# Patient Record
Sex: FEMALE | Race: OTHER | Employment: OTHER | URBAN - METROPOLITAN AREA
[De-identification: names, ages, dates, MRNs, and addresses within clinical notes are randomized per-mention and may not be internally consistent; named-entity substitution may affect disease eponyms.]

---

## 2019-04-12 ENCOUNTER — APPOINTMENT (OUTPATIENT)
Dept: GENERAL RADIOLOGY | Age: 79
End: 2019-04-12
Attending: PHYSICIAN ASSISTANT
Payer: MEDICARE

## 2019-04-12 ENCOUNTER — HOSPITAL ENCOUNTER (EMERGENCY)
Age: 79
Discharge: HOME | End: 2019-04-12
Payer: MEDICARE

## 2019-04-12 VITALS
OXYGEN SATURATION: 99 % | DIASTOLIC BLOOD PRESSURE: 67 MMHG | SYSTOLIC BLOOD PRESSURE: 184 MMHG | TEMPERATURE: 98 F | RESPIRATION RATE: 16 BRPM | BODY MASS INDEX: 30.64 KG/M2 | HEART RATE: 67 BPM | HEIGHT: 59 IN | WEIGHT: 152 LBS

## 2019-04-12 DIAGNOSIS — M51.36 DEGENERATIVE DISC DISEASE, LUMBAR: ICD-10-CM

## 2019-04-12 DIAGNOSIS — M19.90 OSTEOARTHRITIS, UNSPECIFIED OSTEOARTHRITIS TYPE, UNSPECIFIED SITE: ICD-10-CM

## 2019-04-12 DIAGNOSIS — M54.31 SCIATICA OF RIGHT SIDE: Primary | ICD-10-CM

## 2019-04-12 PROCEDURE — 72110 X-RAY EXAM L-2 SPINE 4/>VWS: CPT | Performed by: PHYSICIAN ASSISTANT

## 2019-04-12 PROCEDURE — 99284 EMERGENCY DEPT VISIT MOD MDM: CPT

## 2019-04-12 PROCEDURE — 73562 X-RAY EXAM OF KNEE 3: CPT | Performed by: PHYSICIAN ASSISTANT

## 2019-04-12 RX ORDER — METHYLPREDNISOLONE 4 MG/1
TABLET ORAL
Qty: 1 PACKAGE | Refills: 0 | Status: SHIPPED | OUTPATIENT
Start: 2019-04-12 | End: 2019-04-17

## 2019-04-12 RX ORDER — ACETAMINOPHEN 500 MG
1000 TABLET ORAL ONCE
Status: COMPLETED | OUTPATIENT
Start: 2019-04-12 | End: 2019-04-12

## 2019-04-12 RX ORDER — ACETAMINOPHEN 500 MG
1000 TABLET ORAL EVERY 4 HOURS PRN
Qty: 100 TABLET | Refills: 0 | Status: SHIPPED | OUTPATIENT
Start: 2019-04-12 | End: 2019-04-19

## 2019-04-12 NOTE — ED PROVIDER NOTES
Patient Seen in: [facility] In [place]    History   Patient presents with:  Back Pain (musculoskeletal)    Stated Complaint: back pain, knee swelling    62-year-old  female with a history of diabetes, diabetic retinopathy, hyp Left knee: She exhibits decreased range of motion and swelling.  She exhibits no effusion, no ecchymosis, no deformity, no laceration, no erythema, normal alignment, no LCL laxity, normal patellar mobility, no bony tenderness, normal meniscus and no MC PROCEDURE:  XR LUMBAR SPINE (MIN 4 VIEWS) (CPT=72110)  TECHNIQUE:  AP, lateral, oblique, and coned down L5-S1 views were obtained. COMPARISON:  Meaghan Morelos ROUTINE, 6/11/2008, 11:57.   INDICATIONS:  back pain, knee swelling  PATIENT STATED HISTOR Dosepack: use as directed on packaging  Qty: 1 Package Refills: 0    acetaminophen 500 MG Oral Tab  Take 2 tablets (1,000 mg total) by mouth every 4 (four) hours as needed for Pain.   Qty: 100 tablet Refills: 0

## 2019-04-12 NOTE — ED INITIAL ASSESSMENT (HPI)
r sided lower back pain for about 3 months- pain worsening over last couple of weeks with radiation around hip and down r leg

## 2019-04-23 PROBLEM — E03.8 HYPOTHYROIDISM DUE TO HASHIMOTO'S THYROIDITIS: Status: ACTIVE | Noted: 2019-04-23

## 2019-04-23 PROBLEM — M54.40 CHRONIC MIDLINE LOW BACK PAIN WITH SCIATICA: Status: ACTIVE | Noted: 2019-04-23

## 2019-04-23 PROBLEM — E78.2 MIXED HYPERLIPIDEMIA: Status: ACTIVE | Noted: 2019-04-23

## 2019-04-23 PROBLEM — E11.9 CONTROLLED TYPE 2 DIABETES MELLITUS WITHOUT COMPLICATION, WITHOUT LONG-TERM CURRENT USE OF INSULIN (HCC): Status: ACTIVE | Noted: 2019-04-23

## 2019-04-23 PROBLEM — I10 ESSENTIAL HYPERTENSION: Status: ACTIVE | Noted: 2019-04-23

## 2019-04-23 PROBLEM — E06.3 HYPOTHYROIDISM DUE TO HASHIMOTO'S THYROIDITIS: Status: ACTIVE | Noted: 2019-04-23

## 2019-04-23 PROBLEM — G89.29 CHRONIC MIDLINE LOW BACK PAIN WITH SCIATICA: Status: ACTIVE | Noted: 2019-04-23

## 2019-04-23 PROBLEM — E11.3299 NON-PROLIFERATIVE DIABETIC RETINOPATHY (HCC): Status: ACTIVE | Noted: 2019-04-23

## 2020-02-11 PROBLEM — E11.9 DIABETES MELLITUS WITHOUT COMPLICATION (HCC): Status: ACTIVE | Noted: 2019-04-23

## 2020-02-26 PROBLEM — F41.9 ANXIETY AND DEPRESSION: Status: ACTIVE | Noted: 2020-02-26

## 2020-02-26 PROBLEM — F32.A ANXIETY AND DEPRESSION: Status: ACTIVE | Noted: 2020-02-26

## 2020-03-28 ENCOUNTER — OFFICE VISIT (OUTPATIENT)
Dept: URGENT CARE | Age: 80
End: 2020-03-28

## 2020-03-28 VITALS
RESPIRATION RATE: 16 BRPM | DIASTOLIC BLOOD PRESSURE: 66 MMHG | TEMPERATURE: 98.1 F | OXYGEN SATURATION: 97 % | HEART RATE: 58 BPM | SYSTOLIC BLOOD PRESSURE: 167 MMHG

## 2020-03-28 DIAGNOSIS — M17.12 OSTEOARTHRITIS OF LEFT KNEE, UNSPECIFIED OSTEOARTHRITIS TYPE: Primary | ICD-10-CM

## 2020-03-28 PROCEDURE — 99203 OFFICE O/P NEW LOW 30 MIN: CPT | Performed by: FAMILY MEDICINE

## 2020-03-28 RX ORDER — HYDROCHLOROTHIAZIDE 12.5 MG/1
TABLET ORAL
COMMUNITY
Start: 2020-03-16

## 2020-03-28 RX ORDER — LEVOTHYROXINE SODIUM 0.07 MG/1
TABLET ORAL
COMMUNITY
Start: 2020-01-05

## 2020-03-28 RX ORDER — FLUTICASONE PROPIONATE 50 MCG
SPRAY, SUSPENSION (ML) NASAL
COMMUNITY
Start: 2020-02-11

## 2020-03-28 RX ORDER — LOSARTAN POTASSIUM 50 MG/1
TABLET ORAL
COMMUNITY
Start: 2020-03-16

## 2020-03-28 RX ORDER — DAPAGLIFLOZIN 10 MG/1
TABLET, FILM COATED ORAL
COMMUNITY
Start: 2020-03-26

## 2020-03-28 RX ORDER — CITALOPRAM 20 MG/1
TABLET ORAL
COMMUNITY
Start: 2020-02-18

## 2020-03-28 RX ORDER — METHYLPREDNISOLONE 4 MG
TABLET, DOSE PACK ORAL
Qty: 21 TABLET | Refills: 0 | Status: SHIPPED | OUTPATIENT
Start: 2020-03-28 | End: 2020-04-03

## 2020-03-28 RX ORDER — ROSUVASTATIN CALCIUM 20 MG/1
TABLET, COATED ORAL
COMMUNITY
Start: 2020-02-18

## 2020-03-28 RX ORDER — DICLOFENAC SODIUM 75 MG/1
TABLET, DELAYED RELEASE ORAL
COMMUNITY
Start: 2020-03-26

## 2020-03-28 RX ORDER — VERAPAMIL HYDROCHLORIDE 240 MG/1
TABLET, FILM COATED, EXTENDED RELEASE ORAL
COMMUNITY
Start: 2020-02-18

## 2020-03-28 SDOH — HEALTH STABILITY: MENTAL HEALTH: HOW OFTEN DO YOU HAVE A DRINK CONTAINING ALCOHOL?: NEVER

## 2021-02-06 DIAGNOSIS — Z23 NEED FOR VACCINATION: ICD-10-CM

## 2021-04-26 ENCOUNTER — WALK IN (OUTPATIENT)
Dept: URGENT CARE | Age: 81
End: 2021-04-26

## 2021-04-26 VITALS
RESPIRATION RATE: 20 BRPM | HEART RATE: 64 BPM | SYSTOLIC BLOOD PRESSURE: 134 MMHG | TEMPERATURE: 98.4 F | DIASTOLIC BLOOD PRESSURE: 86 MMHG | OXYGEN SATURATION: 97 %

## 2021-04-26 DIAGNOSIS — B02.9 HERPES ZOSTER WITHOUT COMPLICATION: Primary | ICD-10-CM

## 2021-04-26 PROCEDURE — 99214 OFFICE O/P EST MOD 30 MIN: CPT | Performed by: FAMILY MEDICINE

## 2021-04-26 RX ORDER — VALACYCLOVIR HYDROCHLORIDE 1 G/1
1000 TABLET, FILM COATED ORAL 3 TIMES DAILY
Qty: 21 TABLET | Refills: 0 | Status: SHIPPED | OUTPATIENT
Start: 2021-04-26 | End: 2021-05-03

## 2021-10-28 ENCOUNTER — WALK IN (OUTPATIENT)
Dept: URGENT CARE | Age: 81
End: 2021-10-28

## 2021-10-28 VITALS
HEART RATE: 75 BPM | OXYGEN SATURATION: 98 % | TEMPERATURE: 97.4 F | DIASTOLIC BLOOD PRESSURE: 68 MMHG | SYSTOLIC BLOOD PRESSURE: 148 MMHG | RESPIRATION RATE: 20 BRPM

## 2021-10-28 DIAGNOSIS — R35.0 FREQUENT URINATION: Primary | ICD-10-CM

## 2021-10-28 LAB
BACTERIA: ABNORMAL
BILIRUBIN URINE: NEGATIVE
BLOOD URINE: ABNORMAL
CLARITY: ABNORMAL
COLOR: YELLOW
GLUCOSE QUALITATIVE U: >=500
KETONES, URINE: NEGATIVE
LEUKOCYTE ESTERASE URINE: ABNORMAL
NITRITE URINE: NEGATIVE
PH URINE: 6 (ref 5–7)
RED BLOOD CELLS URINE: ABNORMAL (ref 0–3)
SPECIFIC GRAVITY URINE: 1.02 (ref 1–1.03)
SQUAMOUS EPITHELIAL CELLS: ABNORMAL
URINE PROTEIN, QUAL (DIPSTICK): NEGATIVE
UROBILINOGEN URINE: <2
WBC CLUMPS: ABNORMAL
WHITE BLOOD CELLS URINE: >100 (ref 0–5)

## 2021-10-28 PROCEDURE — 81003 URINALYSIS AUTO W/O SCOPE: CPT | Performed by: INTERNAL MEDICINE

## 2021-10-28 PROCEDURE — 87186 SC STD MICRODIL/AGAR DIL: CPT | Performed by: INTERNAL MEDICINE

## 2021-10-28 PROCEDURE — 87088 URINE BACTERIA CULTURE: CPT | Performed by: INTERNAL MEDICINE

## 2021-10-28 PROCEDURE — 87086 URINE CULTURE/COLONY COUNT: CPT | Performed by: INTERNAL MEDICINE

## 2021-10-28 PROCEDURE — 99214 OFFICE O/P EST MOD 30 MIN: CPT | Performed by: INTERNAL MEDICINE

## 2021-10-28 RX ORDER — SULFAMETHOXAZOLE AND TRIMETHOPRIM 800; 160 MG/1; MG/1
1 TABLET ORAL 2 TIMES DAILY
Qty: 10 TABLET | Refills: 0 | Status: SHIPPED | OUTPATIENT
Start: 2021-10-28 | End: 2021-11-02

## 2021-10-30 LAB — FINAL REPORT: ABNORMAL

## 2021-11-04 PROBLEM — N18.32 STAGE 3B CHRONIC KIDNEY DISEASE (HCC): Status: ACTIVE | Noted: 2021-11-04

## 2021-11-04 PROBLEM — E11.22 TYPE 2 DIABETES MELLITUS WITH DIABETIC CHRONIC KIDNEY DISEASE (HCC): Status: ACTIVE | Noted: 2021-11-04

## 2021-11-04 PROBLEM — E11.65 TYPE 2 DIABETES MELLITUS WITH HYPERGLYCEMIA (HCC): Status: ACTIVE | Noted: 2021-11-04

## 2021-11-30 ENCOUNTER — WALK IN (OUTPATIENT)
Dept: URGENT CARE | Age: 81
End: 2021-11-30

## 2021-11-30 VITALS
RESPIRATION RATE: 16 BRPM | TEMPERATURE: 97.9 F | HEART RATE: 76 BPM | OXYGEN SATURATION: 99 % | SYSTOLIC BLOOD PRESSURE: 138 MMHG | DIASTOLIC BLOOD PRESSURE: 60 MMHG

## 2021-11-30 DIAGNOSIS — R35.0 URINE FREQUENCY: Primary | ICD-10-CM

## 2021-11-30 LAB
BILIRUBIN URINE: NEGATIVE
BLOOD URINE: ABNORMAL
CLARITY: ABNORMAL
COLOR: YELLOW
GLUCOSE QUALITATIVE U: >=500
KETONES, URINE: NEGATIVE
LEUKOCYTE ESTERASE URINE: ABNORMAL
NITRITE URINE: NEGATIVE
PH URINE: 7 (ref 5–7)
RED BLOOD CELLS URINE: ABNORMAL (ref 0–3)
SPECIFIC GRAVITY URINE: 1.02 (ref 1–1.03)
SQUAMOUS EPITHELIAL CELLS: ABNORMAL
URINE PROTEIN, QUAL (DIPSTICK): NEGATIVE
UROBILINOGEN URINE: <2
WBC CLUMPS: ABNORMAL
WHITE BLOOD CELLS URINE: >100 (ref 0–5)

## 2021-11-30 PROCEDURE — 81003 URINALYSIS AUTO W/O SCOPE: CPT | Performed by: INTERNAL MEDICINE

## 2021-11-30 PROCEDURE — 87186 SC STD MICRODIL/AGAR DIL: CPT | Performed by: INTERNAL MEDICINE

## 2021-11-30 PROCEDURE — 87088 URINE BACTERIA CULTURE: CPT | Performed by: INTERNAL MEDICINE

## 2021-11-30 PROCEDURE — 87086 URINE CULTURE/COLONY COUNT: CPT | Performed by: INTERNAL MEDICINE

## 2021-11-30 PROCEDURE — 99214 OFFICE O/P EST MOD 30 MIN: CPT | Performed by: FAMILY MEDICINE

## 2021-11-30 RX ORDER — CIPROFLOXACIN 250 MG/1
250 TABLET, FILM COATED ORAL 2 TIMES DAILY
Qty: 14 TABLET | Refills: 0 | Status: SHIPPED | OUTPATIENT
Start: 2021-11-30 | End: 2021-12-07

## 2021-12-03 LAB — FINAL REPORT: ABNORMAL

## 2023-11-22 ENCOUNTER — HOSPITAL ENCOUNTER (EMERGENCY)
Age: 83
Discharge: HOME | End: 2023-11-22
Payer: MEDICARE

## 2023-11-22 ENCOUNTER — APPOINTMENT (OUTPATIENT)
Dept: GENERAL RADIOLOGY | Age: 83
End: 2023-11-22
Attending: NURSE PRACTITIONER
Payer: MEDICARE

## 2023-11-22 VITALS
OXYGEN SATURATION: 98 % | SYSTOLIC BLOOD PRESSURE: 175 MMHG | HEART RATE: 67 BPM | BODY MASS INDEX: 25.52 KG/M2 | TEMPERATURE: 98 F | WEIGHT: 130 LBS | HEIGHT: 60 IN | RESPIRATION RATE: 18 BRPM | DIASTOLIC BLOOD PRESSURE: 70 MMHG

## 2023-11-22 DIAGNOSIS — S43.402A SPRAIN OF LEFT SHOULDER, UNSPECIFIED SHOULDER SPRAIN TYPE, INITIAL ENCOUNTER: Primary | ICD-10-CM

## 2023-11-22 DIAGNOSIS — S50.02XA CONTUSION OF LEFT ELBOW, INITIAL ENCOUNTER: ICD-10-CM

## 2023-11-22 PROCEDURE — 99283 EMERGENCY DEPT VISIT LOW MDM: CPT

## 2023-11-22 PROCEDURE — 73030 X-RAY EXAM OF SHOULDER: CPT | Performed by: NURSE PRACTITIONER

## 2023-11-22 PROCEDURE — 73080 X-RAY EXAM OF ELBOW: CPT | Performed by: NURSE PRACTITIONER

## 2024-01-12 ENCOUNTER — TELEPHONE (OUTPATIENT)
Dept: ORTHOPEDICS CLINIC | Facility: CLINIC | Age: 84
End: 2024-01-12

## 2024-01-12 DIAGNOSIS — M54.50 LOW BACK PAIN, UNSPECIFIED BACK PAIN LATERALITY, UNSPECIFIED CHRONICITY, UNSPECIFIED WHETHER SCIATICA PRESENT: Primary | ICD-10-CM

## 2024-01-15 NOTE — TELEPHONE ENCOUNTER
XR ordered per ortho protocol.   XR scheduled.   Called patient to notify them to arrive 15 minutes early to complete imaging.

## 2024-01-18 ENCOUNTER — OFFICE VISIT (OUTPATIENT)
Dept: ORTHOPEDICS CLINIC | Facility: CLINIC | Age: 84
End: 2024-01-18
Payer: MEDICARE

## 2024-01-18 ENCOUNTER — HOSPITAL ENCOUNTER (OUTPATIENT)
Dept: GENERAL RADIOLOGY | Age: 84
Discharge: HOME | End: 2024-01-18
Attending: STUDENT IN AN ORGANIZED HEALTH CARE EDUCATION/TRAINING PROGRAM
Payer: MEDICARE

## 2024-01-18 DIAGNOSIS — M41.50 DEGENERATIVE SCOLIOSIS: Primary | ICD-10-CM

## 2024-01-18 DIAGNOSIS — M54.50 LOW BACK PAIN, UNSPECIFIED BACK PAIN LATERALITY, UNSPECIFIED CHRONICITY, UNSPECIFIED WHETHER SCIATICA PRESENT: ICD-10-CM

## 2024-01-18 PROCEDURE — 72100 X-RAY EXAM L-S SPINE 2/3 VWS: CPT | Performed by: STUDENT IN AN ORGANIZED HEALTH CARE EDUCATION/TRAINING PROGRAM

## 2024-01-18 PROCEDURE — 1160F RVW MEDS BY RX/DR IN RCRD: CPT | Performed by: STUDENT IN AN ORGANIZED HEALTH CARE EDUCATION/TRAINING PROGRAM

## 2024-01-18 PROCEDURE — 1159F MED LIST DOCD IN RCRD: CPT | Performed by: STUDENT IN AN ORGANIZED HEALTH CARE EDUCATION/TRAINING PROGRAM

## 2024-01-18 PROCEDURE — 99204 OFFICE O/P NEW MOD 45 MIN: CPT | Performed by: STUDENT IN AN ORGANIZED HEALTH CARE EDUCATION/TRAINING PROGRAM

## 2024-01-18 RX ORDER — METHYLPREDNISOLONE 4 MG/1
TABLET ORAL
Qty: 21 TABLET | Refills: 0 | Status: SHIPPED | OUTPATIENT
Start: 2024-01-18

## 2024-01-18 NOTE — H&P
[facility] - ORTHOPEDICS  [address], [address], [place] [address]  [address] [place] [address]  [phone]     NEW PATIENT VISIT - HISTORY AND PHYSICAL EXAMINATION     Name: Trudy Steel   MRN: [id number]  Date: 01/18/24       CC: Back and leg pain    REFERRED BY: Juan Pierre MD    HPI:   Trudy Steel is a very pleasant 83 year old female who presents today for evaluation of back and leg pain. The distribution of symptoms are: 50% backpain and 50% leg pain. The symptoms began 1 week(s) ago without any significant injury. Since the onset, the symptoms have remained the same. Patient feels pain is aggravated by standing, walking and improved by rest. The patient reports no numbness and no weakness.  The symptom characteristics are as follows: Patient is a 83-year-old female presenting with low back pain radiating down bilateral buttocks.  Patient was prescribed anti-inflammatory with improvement in symptoms.  Patient has also started using rolling walker    Prior spine surgery: none.    Bowel and bladder symptoms: absent.    The patient has not had issues with balance and/or hand dexterity problems such as changes in penmanship or the use of buttons or zippers.    Treatment up to this time has included:    Evaluation: PCP  NSAIDS: have been partially helpful  Narcotic use: None  Physical therapy: None  Spinal injections: None  Others:       PMH:   Past Medical History:   Diagnosis Date    Diabetes (HCC)     Diabetic retinopathy (HCC)     Essential hypertension     Hyperlipidemia     Obesity     Thyroid disease        PAST SURGICAL HX:  Past Surgical History:   Procedure Laterality Date    TUBAL LIGATION         FAMILY HX:  History reviewed. No pertinent family history.    ALLERGIES:  Patient has no known allergies.    MEDICATIONS:   Current Outpatient Medications   Medication Sig Dispense Refill    methylPREDNISolone (MEDROL) 4 MG Oral Tablet Therapy  Pack As directed. 21 tablet 0    metFORMIN HCl 1000 MG Oral Tab Take 1 tablet (1,000 mg total) by mouth 2 (two) times daily. 180 tablet 0    diclofenac 75 MG Oral Tab EC Take 1 tablet (75 mg total) by mouth 2 (two) times daily. 20 tablet 0    citalopram 20 MG Oral Tab Take 1 tablet (20 mg total) by mouth daily. 90 tablet 0    dapagliflozin (FARXIGA) 10 MG Oral Tab Take 1 tablet (10 mg total) by mouth daily. 90 tablet 0    hydroCHLOROthiazide 12.5 MG Oral Tab Take 1 tablet (12.5 mg total) by mouth daily. 90 tablet 0    levothyroxine 100 MCG Oral Tab Take 1 tablet (100 mcg total) by mouth daily. 90 tablet 0    linaGLIPtin (TRADJENTA) 5 mg Oral Tab Take 1 tablet (5 mg total) by mouth daily. 90 tablet 0    losartan 50 MG Oral Tab Take 1 tablet (50 mg total) by mouth daily. 90 tablet 0    rosuvastatin 20 MG Oral Tab Take 1 tablet (20 mg total) by mouth nightly. 90 tablet 0    verapamil  MG Oral Tab CR Take 1 tablet (240 mg total) by mouth nightly. 90 tablet 0    aspirin (ASPIRIN LOW DOSE) 81 MG Oral Tab EC Take 1 tablet (81 mg total) by mouth daily. 90 tablet 0    Blood Glucose Monitoring Suppl (ONETOUCH VERIO) w/Device Does not apply Kit 1 each daily. icd10- e11.9 1 kit 0    Glucose Blood (ONETOUCH VERIO) In Vitro Strip TEST ONCE DAILY 100 strip 11       ROS: A comprehensive 14 point review of systems was performed and was negative aside from the aforementioned per history of present illness.    SOCIAL HX:  Social History     Tobacco Use    Smoking status: Never    Smokeless tobacco: Never   Substance Use Topics    Alcohol use: No         PE:   There were no vitals filed for this visit.  Estimated body mass index is 24.61 kg/m² as calculated from the following:    Height as of 1/2/24: 5' (1.524 m).    Weight as of 1/2/24: 126 lb (57.2 kg).    Physical Exam  Constitutional:       Appearance: Normal appearance.   HENT:      Head: Normocephalic and atraumatic.   Eyes:      Extraocular Movements: Extraocular movements  intact.   Cardiovascular:      Pulses: Normal pulses. Skin warm and well perfused.  Pulmonary:      Effort: Pulmonary effort is normal. No respiratory distress.   Skin:     General: Skin is warm.   Psychiatric:         Mood and Affect: Mood normal.     Spine Exam:    Normal gait without difficulty  Able to heel, toe, tandem gait without difficulty  Level shoulders and hips in even stance    Restricted L-spine ROM    No tenderness to palpation of L-spine    Straight leg raise test: negative    Sustained clonus: negative    LE Strength: 5/5 IP QUAD TA EHL GSC  LE Sensation: normal in L2-S1 distribution  LE reflexes: normal    Radiographic Examination/Diagnostics:  XR personally viewed, independently interpreted and radiology report was reviewed.  X-ray of the lumbar spine demonstrates degenerative scoliosis with moderate facet arthropathy throughout    IMPRESSION: Trudy Steel is a 83 year old female with degenerative scoliosis and likely lumbar stenosis    PLAN:     We reviewed the patients history, symptoms, exam findings, and imaging today.  We had a detailed discussion outlining the etiology, anatomy, pathophysiology, and natural history of lumbar stenosis. The typical management of this condition may include lifestyle modification, NSAIDs, physical therapy, oral steroids, epidural injections, neuromodulatory medications, and sometimes pain medications.  Based on our discussion today we would like to have the patient initiate our recommendations for continued conservative therapy in the treatment of their condition noted in the assessment section.     - Referred to Physical Therapy: home exercise program, aerobic exercises, core strengthening and conditioning, possible manipulative therapy,  and modalities as indicated  - Provided home exercises  - Due to patient's symptoms, discussed risk and benefit of short-term steroid use. Prescribed medrol dose pack and strict glycemic control    FOLLOW-UP:  We will see  her back in follow-up in one month, or sooner if any problems arise. Patient understands and agrees with plan.      Jesus Raya MD  Orthopedic Spine Surgeon  [facility]   [address] [place] [address], [facility], [address]  [email or web address]  t: [phone]   f: [phone]        This note was dictated using Dragon software.  While it was briefly proofread prior to completion, some grammatical, spelling, and word choice errors due to dictation may still occur.

## 2024-07-09 PROBLEM — F33.0 MILD RECURRENT MAJOR DEPRESSION: Chronic | Status: ACTIVE | Noted: 2024-07-09

## 2025-02-04 ENCOUNTER — HOSPITAL ENCOUNTER (EMERGENCY)
Age: 85
Discharge: HOME | End: 2025-02-04
Attending: EMERGENCY MEDICINE
Payer: MEDICARE

## 2025-02-04 VITALS
DIASTOLIC BLOOD PRESSURE: 79 MMHG | RESPIRATION RATE: 16 BRPM | SYSTOLIC BLOOD PRESSURE: 163 MMHG | TEMPERATURE: 98 F | OXYGEN SATURATION: 100 % | HEIGHT: 60 IN | WEIGHT: 125 LBS | BODY MASS INDEX: 24.54 KG/M2 | HEART RATE: 60 BPM

## 2025-02-04 DIAGNOSIS — L60.0 INGROWN TOENAIL: Primary | ICD-10-CM

## 2025-02-04 PROCEDURE — 99283 EMERGENCY DEPT VISIT LOW MDM: CPT

## 2025-02-04 RX ORDER — NAPROXEN 500 MG/1
500 TABLET ORAL 2 TIMES DAILY PRN
Qty: 20 TABLET | Refills: 0 | Status: SHIPPED | OUTPATIENT
Start: 2025-02-04 | End: 2025-02-14

## 2025-02-04 RX ORDER — SULFAMETHOXAZOLE AND TRIMETHOPRIM 800; 160 MG/1; MG/1
1 TABLET ORAL 2 TIMES DAILY
Qty: 14 TABLET | Refills: 0 | Status: SHIPPED | OUTPATIENT
Start: 2025-02-04 | End: 2025-02-11

## 2025-02-04 NOTE — ED PROVIDER NOTES
Patient Seen in: [facility] [place]      History     Chief Complaint   Patient presents with    Cellulitis     Stated Complaint: left great toe pain, diabetic, pain one week    Subjective:   HPI      84-year-old female with a past medical history as below including hypertension and diabetes presents with pain in her left great toe along her nail fold.  Patient is mainly German-speaking with daughter interpreting, declined video .  Patient reports mild swelling redness with no drainage.  Daughter states she is trying to set up an appointment with a podiatrist but decided to come to ED instead.  Denies fever or chills.    Objective:     Past Medical History:    Diabetes (HCC)    Diabetic retinopathy (HCC)    Essential hypertension    Hyperlipidemia    Obesity    Thyroid disease              No pertinent past surgical history.              No pertinent social history.                Physical Exam     ED Triage Vitals [02/04/25 1521]   /60   Pulse 63   Resp 18   Temp 97.7 °F (36.5 °C)   Temp src Temporal   SpO2 97 %   O2 Device None (Room air)       Current Vitals:   Vital Signs  BP: 148/60  Pulse: 63  Resp: 18  Temp: 97.7 °F (36.5 °C)  Temp src: Temporal    Oxygen Therapy  SpO2: 97 %  O2 Device: None (Room air)        Physical Exam  Vitals and nursing note reviewed.   Constitutional:       Appearance: She is well-developed.   HENT:      Head: Normocephalic and atraumatic.      Mouth/Throat:      Mouth: Mucous membranes are moist.   Eyes:      General: No scleral icterus.  Musculoskeletal:        Feet:    Skin:     General: Skin is warm and dry.   Neurological:      General: No focal deficit present.      Mental Status: She is alert and oriented to person, place, and time.      Cranial Nerves: No cranial nerve deficit.      Motor: No weakness.   Psychiatric:         Mood and Affect: Mood normal.         Behavior: Behavior normal.             ED Course   Labs Reviewed - No data  to display                MDM      84-year-old female with a past medical history as below including hypertension and diabetes presents with pain in her left great toe along her nail fold.     Differential includes but is not limited to ingrown toenail, mild cellulitis.  No signs of abscess.    Will treat with Rx for Bactrim and naproxen.  Instructed to follow-up with podiatrist next 1 to 2 days for further treatment of ingrown toenail.  Return precaution discussed      Medical Decision Making  Amount and/or Complexity of Data Reviewed  Independent Historian: caregiver     Details: Daughter, see HPI    Risk  Prescription drug management.        Disposition and Plan     Clinical Impression:  1. Ingrown toenail         Disposition:  Discharge  2/4/2025  4:17 pm    Follow-up:  Ananth Amador DPM  [address] [address]  [phone]    Schedule an appointment as soon as possible for a visit in 2 day(s)            Medications Prescribed:  Current Discharge Medication List        START taking these medications    Details   sulfamethoxazole-trimethoprim -160 MG Oral Tab per tablet Take 1 tablet by mouth 2 (two) times daily for 7 days.  Qty: 14 tablet, Refills: 0      naproxen 500 MG Oral Tab Take 1 tablet (500 mg total) by mouth 2 (two) times daily as needed.  Qty: 20 tablet, Refills: 0                 Supplementary Documentation:

## 2025-02-05 ENCOUNTER — PATIENT OUTREACH (OUTPATIENT)
Dept: CASE MANAGEMENT | Age: 85
End: 2025-02-05

## 2025-02-05 NOTE — PROGRESS NOTES
Transitions of Care Navigation  Discharge Date: 25  Contact Date: 2025    Transitions of Care Assessment:  BRANDON Initial Assessment    General:  Assessment completed with: Patient  Patient Subjective: Spoke with patient's daughter Corrina. Corrina states that her mother is doing ok but is concerned for her toe. Corrina states the patient's toe looks about the same as it did yesterday. Continues to be red and swollen. No fever. Started her antibiotic today and is taking the Naproxen. Corrina states her biggest concern is getting her an appointment with Corrina states that she has the referral to the Podiatrist which she obtained by her PCP this morning but she was told no one can see her when she called to schedule an appointment. (See Nursing Interventions)  Chief Complaint: Ingrown toenail  Verify patient name and  with patient/ caregiver: Yes    Hospital Stay/Discharge:  Tell me what you understand of why you were in the hospital or emergency department: an ingrown toenail  Prior to leaving the hospital were your Discharge Instructions reviewed with you?: Yes  Did you receive a copy of your written Discharge Instructions?: Yes  What questions do you have about your Discharge Instructions?: patient denies  Do you feel better or worse since you left the hospital or emergency department?: Same    Follow - Up Appointment:  Do you have a follow-up appointment?: No  Are there any barriers to getting to your follow-up appointment?: No    Home Health/DME:  Prior to leaving the hospital was Home Health (HH) arranged for you?: N/A  Are HH needs identified by staff during the assessment?: No     Prior to leaving the hospital or emergency department was Durable Medical Equipment (DME), medical supplies, or infusions arranged for you?: N/A  Are DME/medical supply/infusions needs identified by staff during this assessment?: No     Medications/Diet:  Did any of your medications change, during or after your hospital stay or ED  visit?: Yes  Do you have your new or updated medications?: Yes  Do you understand what your medications are for and possible side effects?: No  Educated patient / caregiver on medications.: No  Are there any reasons that keep you from taking your medication as prescribed?: No  Any concerns about medication refills?: No    Were you given a different diet per your Discharge Instructions?: No     Questions/Concerns:  Do you have any questions or concerns that have not been discussed?: Yes         Nursing Interventions:    Advised to continue with the antibiotic and Naproxen. Try a foot soak- warm water with a tsp of epsom salt for 10 mins. Reviewed signs and symptoms of infection.     NCM called Podiatry and scheduled appointment for tomorrow 02/06- 2:30- Dr. Stoney Daniels. [address] [facility] [address]. Placed office on hold and advised daughter of appointment details and she was agreeable- her brother will bring patient tomorrow.     Future Appointments   Date Time Provider Department Center   2/6/2025  2:30 PM [facility]ua DPM [id number]   2/11/2025  3:20 PM [facility] DEXA  1 [facility] DEXA [facility]     All d/c instructions reviewed with pt.  Reviewed when to call MD vs when to go to ER/call 911.  Educated pt on the importance of taking all meds as prescribed as well as close f/u with PCP/specialists.  Pt verbalized understanding and will contact office with any further questions or concerns.         Medications:  Medication Reconciliation:  I am aware of an inpatient discharge within the last 30 days.  The discharge medication list has been reconciled with the patient's current medication list and reviewed by me. See medication list for additions of new medication, and changes to current doses of medications and discontinued medications.  Current Outpatient Medications   Medication Sig Dispense Refill    sulfamethoxazole-trimethoprim -160 MG Oral Tab per tablet Take 1 tablet by mouth 2 (two) times  daily for 7 days. 14 tablet 0    naproxen 500 MG Oral Tab Take 1 tablet (500 mg total) by mouth 2 (two) times daily as needed. 20 tablet 0    hydroCHLOROthiazide 12.5 MG Oral Tab Take 1 tablet (12.5 mg total) by mouth daily. 90 tablet 1    ROSUVASTATIN 20 MG Oral Tab Take 1 tablet (20 mg total) by mouth nightly. 90 tablet 0    CITALOPRAM 20 MG Oral Tab Take 1 tablet (20 mg total) by mouth daily. 90 tablet 0    FARXIGA 10 MG Oral Tab Take 1 tablet (10 mg total) by mouth daily. 90 tablet 0    METFORMIN HCL 1000 MG Oral Tab Take 1 tablet (1,000 mg total) by mouth 2 (two) times daily. 180 tablet 0    LEVOTHYROXINE 88 MCG Oral Tab Take 1 tablet by mouth before breakfast 90 tablet 0    VERAPAMIL  MG Oral Tab CR Take 1 tablet (240 mg total) by mouth nightly. 90 tablet 0    linaGLIPtin (TRADJENTA) 5 mg Oral Tab Take 1 tablet (5 mg total) by mouth daily. 90 tablet 0    LOSARTAN 50 MG Oral Tab Take 1 tablet (50 mg total) by mouth daily. 90 tablet 0    aspirin (ASPIRIN LOW DOSE) 81 MG Oral Tab EC Take 1 tablet (81 mg total) by mouth daily. 90 tablet 0    Blood Glucose Monitoring Suppl (ONETOUCH VERIO) w/Device Does not apply Kit 1 each daily. icd10- e11.9 1 kit 0    Glucose Blood (ONETOUCH VERIO) In Vitro Strip TEST ONCE DAILY 100 strip 11       Diagnosis specifics:  (Please enter following SmartPhrase as applicable)  AMI: .TOCAMI  CHF: .TOCCHF  COPD: .TOCCOPD  CVA: .TOCCVA  CV surgery: .TOCCVSURGERY  Pneumonia: .TOCPNEUMONIA  Re-admit: .TOCREADMIT  Warfarin/Coumadin: .TOCWARFARIN    Follow-up Appointments:  Your appointments       Date & Time Appointment Department (Center)    Feb 11, 2025 3:20 PM CST X-ray Bone Densitometry (Dexa) with [facility] DEXA  1 [facility] DEXA [facility] ([facility])    It is recommended that you wear a 2 piece outfit that does not contain any metal such as snaps and zippers, etc.  Attention women: Underwire bras will need to be removed prior to the scan.    If you have the  report from a prior DEXA scan performed elsewhere, please bring the report with you to your appointment.       Feb 11, 2025 3:20 PM CST X-ray Bone Densitometry (Dexa) with PFS HERBERT 1 [facility] ([facility])    It is recommended that you wear a 2 piece outfit that does not contain any metal such as snaps and zippers, etc.  Attention women: Underwire bras will need to be removed prior to the scan.    If you have the report from a prior DEXA scan performed elsewhere, please bring the report with you to your appointment.       Feb 11, 2025 3:20 PM CST X-ray Bone Densitometry (Dexa) with PFS XR [address]-ray [facility] ([facility])    It is recommended that you wear a 2 piece outfit that does not contain any metal such as snaps and zippers, etc.  Attention women: Underwire bras will need to be removed prior to the scan.    If you have the report from a prior DEXA scan performed elsewhere, please bring the report with you to your appointment.             [facility] DEXA - [facility]  [address]  [facility] [address]  [phone] [facility] Mammography [facility]  [address]  [facility] [address]  [phone] [facility] X-ray [facility]  [address]  [facility] [address]  [phone]            [facility] Clinic  Was TCC Ordered: No    Specialist  Does the patient have any other follow-up appointment(s) need to be scheduled? No   -If yes: Nurse Care Manager reviewed upcoming specialist appointments with patient: Yes   -Does the patient need assistance scheduling appointment(s): No- RN scheduled appointment       Book By Date: 02/11/2025

## 2025-02-05 NOTE — PROGRESS NOTES
NCM attempted to reach the patient to complete a transitions of care call. Left message to call back. [facility] provided direct contact info at [phone].

## 2025-02-06 ENCOUNTER — OFFICE VISIT (OUTPATIENT)
Dept: PODIATRY CLINIC | Facility: CLINIC | Age: 85
End: 2025-02-06

## 2025-02-06 DIAGNOSIS — E11.42 DIABETIC POLYNEUROPATHY ASSOCIATED WITH TYPE 2 DIABETES MELLITUS (HCC): ICD-10-CM

## 2025-02-06 DIAGNOSIS — M79.675 TOE PAIN, LEFT: ICD-10-CM

## 2025-02-06 DIAGNOSIS — L03.032 PARONYCHIA OF GREAT TOE OF LEFT FOOT: Primary | ICD-10-CM

## 2025-02-06 PROCEDURE — 11730 AVULSION NAIL PLATE SIMPLE 1: CPT | Performed by: STUDENT IN AN ORGANIZED HEALTH CARE EDUCATION/TRAINING PROGRAM

## 2025-02-06 PROCEDURE — 99203 OFFICE O/P NEW LOW 30 MIN: CPT | Performed by: STUDENT IN AN ORGANIZED HEALTH CARE EDUCATION/TRAINING PROGRAM

## 2025-02-07 NOTE — PROGRESS NOTES
Per  block to draw up .2.5ml of 1% Lidocaine and 2.5ml marcaine 0.5% for a left hallux Ingrown medial  borders Toenail Procedure.

## 2025-02-08 NOTE — PROGRESS NOTES
[facility]  Progress Note    Trudy Steel is a 84 year old female.   Chief Complaint   Patient presents with    Consult     Left hallux- rates pain 8/10- language line [id number]/renato         HPI:     Patient is a pleasant 84-year-old female presents to clinic with son for evaluation of painful ingrown nail to her left hallux.  She has pain that she rates an 8 out of 10.  She did go to urgent care and was given Bactrim which she just started taking.  She is wondering what treatment options are available.  She has noticed redness, drainage, pain to site.  She is diabetic and her last hemoglobin A1c was 6.8% on 1/27/2025.  No other complaints are mentioned.  Past medical history, medications, and allergies reviewed.      Allergies: Patient has no known allergies.   Current Outpatient Medications   Medication Sig Dispense Refill    sulfamethoxazole-trimethoprim -160 MG Oral Tab per tablet Take 1 tablet by mouth 2 (two) times daily for 7 days. 14 tablet 0    naproxen 500 MG Oral Tab Take 1 tablet (500 mg total) by mouth 2 (two) times daily as needed. 20 tablet 0    hydroCHLOROthiazide 12.5 MG Oral Tab Take 1 tablet (12.5 mg total) by mouth daily. 90 tablet 1    ROSUVASTATIN 20 MG Oral Tab Take 1 tablet (20 mg total) by mouth nightly. 90 tablet 0    CITALOPRAM 20 MG Oral Tab Take 1 tablet (20 mg total) by mouth daily. 90 tablet 0    FARXIGA 10 MG Oral Tab Take 1 tablet (10 mg total) by mouth daily. 90 tablet 0    METFORMIN HCL 1000 MG Oral Tab Take 1 tablet (1,000 mg total) by mouth 2 (two) times daily. 180 tablet 0    LEVOTHYROXINE 88 MCG Oral Tab Take 1 tablet by mouth before breakfast 90 tablet 0    VERAPAMIL  MG Oral Tab CR Take 1 tablet (240 mg total) by mouth nightly. 90 tablet 0    linaGLIPtin (TRADJENTA) 5 mg Oral Tab Take 1 tablet (5 mg total) by mouth daily. 90 tablet 0    LOSARTAN 50 MG Oral Tab Take 1 tablet (50 mg total) by mouth daily. 90 tablet 0    aspirin (ASPIRIN LOW DOSE) 81 MG  Oral Tab EC Take 1 tablet (81 mg total) by mouth daily. 90 tablet 0    Blood Glucose Monitoring Suppl (ONETOUCH VERIO) w/Device Does not apply Kit 1 each daily. icd10- e11.9 1 kit 0    Glucose Blood (ONETOUCH VERIO) In Vitro Strip TEST ONCE DAILY 100 strip 11      Past Medical History:    Diabetes (HCC)    Diabetic retinopathy (HCC)    Essential hypertension    Hyperlipidemia    Obesity    Thyroid disease      Past Surgical History:   Procedure Laterality Date    Tubal ligation        History reviewed. No pertinent family history.   Social History     Socioeconomic History    Marital status:    Tobacco Use    Smoking status: Never    Smokeless tobacco: Never   Vaping Use    Vaping status: Never Used   Substance and Sexual Activity    Alcohol use: No    Drug use: No           REVIEW OF SYSTEMS:     No n/v/f/c.      EXAM:   There were no vitals taken for this visit.  GENERAL: well developed, well nourished, in no apparent distress  EXTREMITIES:   1. Integument: Normal skin temperature and turgor. Medial border of left great toe is incurvated with erythema and edema noted.  2. Vascular:Pedal pulses are palpable.    3. Musculoskeletal: All muscle groups are graded 5 out of 5 in the foot and ankle. Pain noted to medial border of left great toe.   4. Neurological: Normal sharp dull sensation; reflexes normal.          ASSESSMENT AND PLAN:   Diagnoses and all orders for this visit:    Paronychia of great toe of left foot    Toe pain, left    Diabetic polyneuropathy associated with type 2 diabetes mellitus (HCC)        Plan:    -Patient examined, chart history reviewed.  -Discussed etiology of patient's condition and various treatment options.  -Recommend nail avulsion to medial border of left great toe at this time given paronychia to site.  Discussed procedure in detail with patient  including benefits, risks, and recovery period.  Patient agreeable with procedure and written consent was obtained.    Procedure as  follows:  After prepping site with alcohol, administered local infiltrative block to left hallux consisting of 5 cc of 1:1 mixture of 0.5% Marcaine plain and 1% lidocaine plain.  After sufficient anesthesia was achieved, the digit was prepped with Betadine.  Next, using a hemostat, the medial border of the left hallux nail was freed.  An English anvil was then used to cut the incurvated portion of the nail down to the nail matrix.  A hemostat was once again used to remove the incurvated portion of the nail in its entirety.  The site was then copiously irrigated with saline and patted dry.  The site was dressed with bacitracin, gauze, and mildly compressive Coban wrap.  The patient tolerated the procedure well and there were no complications.  No tourniquet was used for the procedure.    -All soaking and aftercare instructions were discussed with the patient.  Informational handout was dispensed.  -Finish bactrim that was prescribed by urgent care.  -Educated patient on acute signs of infection advised patient to seek immediate medical attention if any concerns arise.     The patient indicates understanding of these issues and agrees to the plan.    RTC 1-2 weeks for re-evaluation or sooner if other concerns arise.    Edward Lua DPM    Dragon speech recognition software was used to prepare this note.  Errors in word recognition may occur.  Please contact me with any questions/concerns with this note.

## 2025-02-11 ENCOUNTER — HOSPITAL ENCOUNTER (OUTPATIENT)
Dept: BONE DENSITY | Age: 85
Discharge: HOME | End: 2025-02-11
Attending: INTERNAL MEDICINE
Payer: MEDICARE

## 2025-02-11 DIAGNOSIS — Z78.0 POST-MENOPAUSAL: ICD-10-CM

## 2025-02-11 PROCEDURE — 77080 DXA BONE DENSITY AXIAL: CPT | Performed by: INTERNAL MEDICINE

## 2025-02-20 ENCOUNTER — OFFICE VISIT (OUTPATIENT)
Dept: PODIATRY CLINIC | Facility: CLINIC | Age: 85
End: 2025-02-20

## 2025-02-20 DIAGNOSIS — M20.41 HAMMER TOES OF BOTH FEET: ICD-10-CM

## 2025-02-20 DIAGNOSIS — M20.42 HAMMER TOES OF BOTH FEET: ICD-10-CM

## 2025-02-20 DIAGNOSIS — L03.032 PARONYCHIA OF GREAT TOE OF LEFT FOOT: Primary | ICD-10-CM

## 2025-02-20 DIAGNOSIS — B35.1 ONYCHOMYCOSIS: ICD-10-CM

## 2025-02-20 DIAGNOSIS — E11.42 DIABETIC POLYNEUROPATHY ASSOCIATED WITH TYPE 2 DIABETES MELLITUS (HCC): ICD-10-CM

## 2025-02-20 PROCEDURE — 1160F RVW MEDS BY RX/DR IN RCRD: CPT | Performed by: STUDENT IN AN ORGANIZED HEALTH CARE EDUCATION/TRAINING PROGRAM

## 2025-02-20 PROCEDURE — 99213 OFFICE O/P EST LOW 20 MIN: CPT | Performed by: STUDENT IN AN ORGANIZED HEALTH CARE EDUCATION/TRAINING PROGRAM

## 2025-02-20 PROCEDURE — 1159F MED LIST DOCD IN RCRD: CPT | Performed by: STUDENT IN AN ORGANIZED HEALTH CARE EDUCATION/TRAINING PROGRAM

## 2025-02-20 PROCEDURE — 1126F AMNT PAIN NOTED NONE PRSNT: CPT | Performed by: STUDENT IN AN ORGANIZED HEALTH CARE EDUCATION/TRAINING PROGRAM

## 2025-02-20 NOTE — PROGRESS NOTES
[facility]  Progress Note    Trudy Steel is a 84 year old female.   Chief Complaint   Patient presents with    Ingrown Toenail     Left hallux f/u - states she has no pain - no drainage - this AM her CO=897         HPI:     Patient is a pleasant 84-year-old female presents to clinic with son for follow up of painful ingrown nail to her left hallux.  She relates that this site is doing much better after nail avulsion procedure.  She has performed soaking aftercare as advised.  Remaining nails are elongated and thickened.  Her blood sugars are 104 mg/dL when last checked.  She is diabetic and her last hemoglobin A1c was 6.8% on 1/27/2025.  No other complaints are mentioned.  Past medical history, medications, and allergies reviewed.      Allergies: Patient has no known allergies.   Current Outpatient Medications   Medication Sig Dispense Refill    Calcium Carbonate-Vit D-Min (CALCIUM 1200) 8066-2069 MG-UNIT Oral Chew Tab Chew 1 tablet by mouth daily. 90 tablet 0    hydroCHLOROthiazide 12.5 MG Oral Tab Take 1 tablet (12.5 mg total) by mouth daily. 90 tablet 1    ROSUVASTATIN 20 MG Oral Tab Take 1 tablet (20 mg total) by mouth nightly. 90 tablet 0    CITALOPRAM 20 MG Oral Tab Take 1 tablet (20 mg total) by mouth daily. 90 tablet 0    FARXIGA 10 MG Oral Tab Take 1 tablet (10 mg total) by mouth daily. 90 tablet 0    METFORMIN HCL 1000 MG Oral Tab Take 1 tablet (1,000 mg total) by mouth 2 (two) times daily. 180 tablet 0    LEVOTHYROXINE 88 MCG Oral Tab Take 1 tablet by mouth before breakfast 90 tablet 0    VERAPAMIL  MG Oral Tab CR Take 1 tablet (240 mg total) by mouth nightly. 90 tablet 0    linaGLIPtin (TRADJENTA) 5 mg Oral Tab Take 1 tablet (5 mg total) by mouth daily. 90 tablet 0    LOSARTAN 50 MG Oral Tab Take 1 tablet (50 mg total) by mouth daily. 90 tablet 0    aspirin (ASPIRIN LOW DOSE) 81 MG Oral Tab EC Take 1 tablet (81 mg total) by mouth daily. 90 tablet 0    Blood Glucose Monitoring Suppl  (ONETOUCH VERIO) w/Device Does not apply Kit 1 each daily. icd10- e11.9 1 kit 0    Glucose Blood (ONETOUCH VERIO) In Vitro Strip TEST ONCE DAILY 100 strip 11      Past Medical History:    Diabetes (HCC)    Diabetic retinopathy (HCC)    Essential hypertension    Hyperlipidemia    Obesity    Thyroid disease      Past Surgical History:   Procedure Laterality Date    Tubal ligation        History reviewed. No pertinent family history.   Social History     Socioeconomic History    Marital status:    Tobacco Use    Smoking status: Never    Smokeless tobacco: Never   Vaping Use    Vaping status: Never Used   Substance and Sexual Activity    Alcohol use: No    Drug use: No           REVIEW OF SYSTEMS:     No n/v/f/c.      EXAM:   There were no vitals taken for this visit.  GENERAL: well developed, well nourished, in no apparent distress  EXTREMITIES:   1. Integument: Normal skin temperature and turgor.  Site of nail avulsion medial border left great toe is well-healed without concerns.  Remaining nails are elongated and thickened.  2. Vascular:Pedal pulses are palpable.    3. Musculoskeletal: All muscle groups are graded 5 out of 5 in the foot and ankle. Pain noted to medial border of left great toe.   4. Neurological: Normal sharp dull sensation; reflexes normal.          ASSESSMENT AND PLAN:   Diagnoses and all orders for this visit:    Paronychia of great toe of left foot    Diabetic polyneuropathy associated with type 2 diabetes mellitus (HCC)    Onychomycosis    Hammer toes of both feet        Plan:    -Patient examined, chart history reviewed.  -Inspected site of nail avulsion.  The site is well-healed without acute signs of infection.  No need for further soaking aftercare at this time.  -Sharply debrided remaining nails with nail nipper x 9.  Nails further smoothed with Dremel.  -Patient should check feet daily and seek immediate medical attention any skin breakdown or other concerns arise.  -Educated patient  acute signs of infection advised patient to seek immediate medical attention any concerns arise.    RTC 2 months routine footcare or sooner if other concerns arise.    Edward Lua DPM    Dragon speech recognition software was used to prepare this note.  Errors in word recognition may occur.  Please contact me with any questions/concerns with this note.

## 2025-02-24 ENCOUNTER — HOSPITAL ENCOUNTER (EMERGENCY)
Age: 85
Discharge: HOME | End: 2025-02-24
Attending: EMERGENCY MEDICINE
Payer: MEDICARE

## 2025-02-24 ENCOUNTER — APPOINTMENT (OUTPATIENT)
Dept: CT IMAGING | Age: 85
End: 2025-02-24
Attending: EMERGENCY MEDICINE
Payer: MEDICARE

## 2025-02-24 VITALS
SYSTOLIC BLOOD PRESSURE: 189 MMHG | RESPIRATION RATE: 16 BRPM | TEMPERATURE: 98 F | OXYGEN SATURATION: 100 % | WEIGHT: 120 LBS | DIASTOLIC BLOOD PRESSURE: 85 MMHG | HEIGHT: 59 IN | BODY MASS INDEX: 24.19 KG/M2 | HEART RATE: 68 BPM

## 2025-02-24 DIAGNOSIS — A08.4 VIRAL ENTERITIS: Primary | ICD-10-CM

## 2025-02-24 DIAGNOSIS — I10 ELEVATED BLOOD PRESSURE READING WITH DIAGNOSIS OF HYPERTENSION: ICD-10-CM

## 2025-02-24 LAB
BASOPHILS # BLD AUTO: 0.03 X10(3) UL (ref 0–0.2)
BASOPHILS NFR BLD AUTO: 0.6 %
BILIRUB UR QL STRIP.AUTO: NEGATIVE
BUN BLD-MCNC: 24 MG/DL (ref 7–18)
CHLORIDE BLD-SCNC: 95 MMOL/L (ref 98–112)
CLARITY UR REFRACT.AUTO: CLEAR
CO2 BLD-SCNC: 27 MMOL/L (ref 21–32)
COLOR UR AUTO: YELLOW
CREAT BLD-MCNC: 1 MG/DL
EGFRCR SERPLBLD CKD-EPI 2021: 56 ML/MIN/1.73M2 (ref 60–?)
EOSINOPHIL # BLD AUTO: 0.1 X10(3) UL (ref 0–0.7)
EOSINOPHIL NFR BLD AUTO: 1.9 %
ERYTHROCYTE [DISTWIDTH] IN BLOOD BY AUTOMATED COUNT: 12.8 %
GLUCOSE BLD-MCNC: 136 MG/DL (ref 70–99)
GLUCOSE UR STRIP.AUTO-MCNC: >=1000 MG/DL
HCT VFR BLD AUTO: 35.2 %
HCT VFR BLD CALC: 34 %
HGB BLD-MCNC: 12 G/DL
IMM GRANULOCYTES # BLD AUTO: 0.02 X10(3) UL (ref 0–1)
IMM GRANULOCYTES NFR BLD: 0.4 %
ISTAT IONIZED CALCIUM FOR CHEM 8: 1.23 MMOL/L (ref 1.12–1.32)
LEUKOCYTE ESTERASE UR QL STRIP.AUTO: NEGATIVE
LYMPHOCYTES # BLD AUTO: 2.15 X10(3) UL (ref 1–4)
LYMPHOCYTES NFR BLD AUTO: 39.9 %
MCH RBC QN AUTO: 32 PG (ref 26–34)
MCHC RBC AUTO-ENTMCNC: 34.1 G/DL (ref 31–37)
MCV RBC AUTO: 93.9 FL
MONOCYTES # BLD AUTO: 0.55 X10(3) UL (ref 0.1–1)
MONOCYTES NFR BLD AUTO: 10.2 %
NEUTROPHILS # BLD AUTO: 2.54 X10 (3) UL (ref 1.5–7.7)
NEUTROPHILS # BLD AUTO: 2.54 X10(3) UL (ref 1.5–7.7)
NEUTROPHILS NFR BLD AUTO: 47 %
NITRITE UR QL STRIP.AUTO: NEGATIVE
PH UR STRIP.AUTO: 5.5 [PH] (ref 5–8)
PLATELET # BLD AUTO: 253 10(3)UL (ref 150–450)
POTASSIUM BLD-SCNC: 3.9 MMOL/L (ref 3.6–5.1)
PROT UR STRIP.AUTO-MCNC: NEGATIVE MG/DL
RBC # BLD AUTO: 3.75 X10(6)UL
RBC UR QL AUTO: NEGATIVE
SODIUM BLD-SCNC: 134 MMOL/L (ref 136–145)
SP GR UR STRIP.AUTO: 1.01 (ref 1–1.03)
TROPONIN I BLD-MCNC: <0.02 NG/ML
UROBILINOGEN UR STRIP.AUTO-MCNC: 0.2 MG/DL
WBC # BLD AUTO: 5.4 X10(3) UL (ref 4–11)

## 2025-02-24 PROCEDURE — 83690 ASSAY OF LIPASE: CPT | Performed by: EMERGENCY MEDICINE

## 2025-02-24 PROCEDURE — 99285 EMERGENCY DEPT VISIT HI MDM: CPT

## 2025-02-24 PROCEDURE — 85025 COMPLETE CBC W/AUTO DIFF WBC: CPT | Performed by: EMERGENCY MEDICINE

## 2025-02-24 PROCEDURE — 93010 ELECTROCARDIOGRAM REPORT: CPT

## 2025-02-24 PROCEDURE — 80047 BASIC METABLC PNL IONIZED CA: CPT

## 2025-02-24 PROCEDURE — 93005 ELECTROCARDIOGRAM TRACING: CPT

## 2025-02-24 PROCEDURE — 99284 EMERGENCY DEPT VISIT MOD MDM: CPT

## 2025-02-24 PROCEDURE — 80053 COMPREHEN METABOLIC PANEL: CPT | Performed by: EMERGENCY MEDICINE

## 2025-02-24 PROCEDURE — 81003 URINALYSIS AUTO W/O SCOPE: CPT | Performed by: EMERGENCY MEDICINE

## 2025-02-24 PROCEDURE — 36415 COLL VENOUS BLD VENIPUNCTURE: CPT

## 2025-02-24 PROCEDURE — 84484 ASSAY OF TROPONIN QUANT: CPT | Performed by: EMERGENCY MEDICINE

## 2025-02-24 PROCEDURE — 84484 ASSAY OF TROPONIN QUANT: CPT

## 2025-02-24 PROCEDURE — 81003 URINALYSIS AUTO W/O SCOPE: CPT

## 2025-02-24 PROCEDURE — 74177 CT ABD & PELVIS W/CONTRAST: CPT | Performed by: EMERGENCY MEDICINE

## 2025-02-24 RX ORDER — IOPAMIDOL 755 MG/ML
85 INJECTION, SOLUTION INTRAVASCULAR
Status: COMPLETED | OUTPATIENT
Start: 2025-02-24 | End: 2025-02-24

## 2025-02-25 ENCOUNTER — PATIENT OUTREACH (OUTPATIENT)
Dept: CASE MANAGEMENT | Age: 85
End: 2025-02-25

## 2025-02-25 LAB
ALBUMIN SERPL-MCNC: 4.5 G/DL (ref 3.2–4.8)
ALBUMIN/GLOB SERPL: 1.7 {RATIO} (ref 1–2)
ALP LIVER SERPL-CCNC: 79 U/L
ALT SERPL-CCNC: 26 U/L
ANION GAP SERPL CALC-SCNC: 7 MMOL/L (ref 0–18)
AST SERPL-CCNC: 28 U/L (ref ?–34)
ATRIAL RATE: 73 BPM
BILIRUB SERPL-MCNC: 0.3 MG/DL (ref 0.2–1.1)
BUN BLD-MCNC: 21 MG/DL (ref 9–23)
CALCIUM BLD-MCNC: 10.4 MG/DL (ref 8.7–10.6)
CHLORIDE SERPL-SCNC: 97 MMOL/L (ref 98–112)
CO2 SERPL-SCNC: 30 MMOL/L (ref 21–32)
CREAT BLD-MCNC: 0.91 MG/DL
EGFRCR SERPLBLD CKD-EPI 2021: 63 ML/MIN/1.73M2 (ref 60–?)
GLOBULIN PLAS-MCNC: 2.7 G/DL (ref 2–3.5)
GLUCOSE BLD-MCNC: 133 MG/DL (ref 70–99)
LIPASE SERPL-CCNC: 59 U/L (ref 12–53)
OSMOLALITY SERPL CALC.SUM OF ELEC: 283 MOSM/KG (ref 275–295)
P AXIS: 31 DEGREES
P-R INTERVAL: 188 MS
POTASSIUM SERPL-SCNC: 4 MMOL/L (ref 3.5–5.1)
PROT SERPL-MCNC: 7.2 G/DL (ref 5.7–8.2)
Q-T INTERVAL: 396 MS
QRS DURATION: 92 MS
QTC CALCULATION (BEZET): 436 MS
R AXIS: 4 DEGREES
SODIUM SERPL-SCNC: 134 MMOL/L (ref 136–145)
T AXIS: 29 DEGREES
TROPONIN I SERPL HS-MCNC: 23 NG/L
VENTRICULAR RATE: 73 BPM

## 2025-02-25 NOTE — ED INITIAL ASSESSMENT (HPI)
Pt to ED with RUQ pain radiating into her back x3 days. +nausea. Pt denies vomiting/diarrhea. Denies urinary sx.

## 2025-02-25 NOTE — PROGRESS NOTES
Transitions of Care Navigation  Discharge Date: 25  Contact Date: 2025    Transitions of Care Assessment:  BRANDON Initial Assessment    General:  Assessment completed with: Children (son Gunnar)  Patient Subjective: Son Gunnar reports pt feeling better, since ER discharge, tolerating bland diet, staying hydrated, resting at home, independent with ADLs.  Son denies pt with any fever, chills, headache, vision changes, dizziness, nausea, vomiting, diarrhea, bleeding, irregular heartbeat or fast pulse, loss of vision, speech or strength or coordination in any body part, intractable abdominal pain, chest pain or shortness of breath at this time.  Chief Complaint: Disposition and Plan     Clinical Impression:  1.  Viral enteritis   2.  Elevated blood pressure reading with diagnosis of hypertension  Verify patient name and  with patient/ caregiver: Yes    Hospital Stay/Discharge:  Tell me what you understand of why you were in the hospital or emergency department: Pt to ED with RUQ pain radiating into her back x3 days. +nausea. Pt denies vomiting/diarrhea. Denies urinary sx.  Prior to leaving the hospital were your Discharge Instructions reviewed with you?: Yes  Did you receive a copy of your written Discharge Instructions?: Yes  What questions do you have about your Discharge Instructions?: none  Do you feel better or worse since you left the hospital or emergency department?: Better    Follow - Up Appointment:  Do you have a follow-up appointment?: No  Are there any barriers to getting to your follow-up appointment?: No    Home Health/DME:  Prior to leaving the hospital was Home Health (HH) arranged for you?: No     Prior to leaving the hospital or emergency department was Durable Medical Equipment (DME), medical supplies, or infusions arranged for you?: No  Are DME/medical supply/infusions needs identified by staff during this assessment?: No     Medications/Diet:  Did any of your medications change, during or  after your hospital stay or ED visit?: No  Do you understand what your medications are for and possible side effects?: Yes  Are there any reasons that keep you from taking your medication as prescribed?: No  Any concerns about medication refills?: No    Were you given a different diet per your Discharge Instructions?: Yes  Diet Type: bland diet, stay hydrated  Reason: viral enteritis  Are there any barriers to following that diet?: No     Questions/Concerns:  Do you have any questions or concerns that have not been discussed?: No     BRANDON Follow-up Assessment    General:  Assessment completed with: Children (radha Maher)  Patient Subjective: Radha Maher reports pt feeling better, since ER discharge, tolerating bland diet, staying hydrated, resting at home, independent with ADLs.  Son denies pt with any fever, chills, headache, vision changes, dizziness, nausea, vomiting, diarrhea, bleeding, irregular heartbeat or fast pulse, loss of vision, speech or strength or coordination in any body part, intractable abdominal pain, chest pain or shortness of breath at this time.  Chief Complaint: Disposition and Plan     Clinical Impression:  1.  Viral enteritis   2.  Elevated blood pressure reading with diagnosis of hypertension  Community Resources: Other (none)    Progress/Care Plan:  Is the patient progressing as planned?: Yes  Frequency/Follow Up Plan: Patient has met goals, no further outreach needed.     Nursing Interventions: Discussed diet, activity, home care, medications and need for f/u visits. Son declines TCC appt--prefers to call Dr. Barrett' office for appt, prior to pt leaving for [place]. Patient has met goals, no further outreach needed. Son aware when to contact PCP/specialists and when to seek emergency care. No further questions/concerns at this time.    Medications:  Medication Reconciliation:  I am aware of an inpatient discharge within the last 30 days.  The discharge medication list has been reconciled with  the patient's current medication list and reviewed by me. See medication list for additions of new medication, and changes to current doses of medications and discontinued medications.  Current Outpatient Medications   Medication Sig Dispense Refill    Calcium Carbonate-Vit D-Min (CALCIUM 1200) 8183-9954 MG-UNIT Oral Chew Tab Chew 1 tablet by mouth daily. 90 tablet 0    hydroCHLOROthiazide 12.5 MG Oral Tab Take 1 tablet (12.5 mg total) by mouth daily. 90 tablet 1    ROSUVASTATIN 20 MG Oral Tab Take 1 tablet (20 mg total) by mouth nightly. 90 tablet 0    CITALOPRAM 20 MG Oral Tab Take 1 tablet (20 mg total) by mouth daily. 90 tablet 0    FARXIGA 10 MG Oral Tab Take 1 tablet (10 mg total) by mouth daily. 90 tablet 0    METFORMIN HCL 1000 MG Oral Tab Take 1 tablet (1,000 mg total) by mouth 2 (two) times daily. 180 tablet 0    LEVOTHYROXINE 88 MCG Oral Tab Take 1 tablet by mouth before breakfast 90 tablet 0    VERAPAMIL  MG Oral Tab CR Take 1 tablet (240 mg total) by mouth nightly. 90 tablet 0    linaGLIPtin (TRADJENTA) 5 mg Oral Tab Take 1 tablet (5 mg total) by mouth daily. 90 tablet 0    LOSARTAN 50 MG Oral Tab Take 1 tablet (50 mg total) by mouth daily. 90 tablet 0    aspirin (ASPIRIN LOW DOSE) 81 MG Oral Tab EC Take 1 tablet (81 mg total) by mouth daily. 90 tablet 0    Blood Glucose Monitoring Suppl (ONETOUCH VERIO) w/Device Does not apply Kit 1 each daily. icd10- e11.9 1 kit 0    Glucose Blood (ONETOUCH VERIO) In Vitro Strip TEST ONCE DAILY 100 strip 11     Follow-up Appointments:    Follow-up:  Catherine Barrett MD  [address] [place] [id number] [phone]    Transitional Care Clinic  Was TCC Ordered: No    Primary Care Provider (If no TCC appointment)  Does patient already have a PCP appointment scheduled? No  Nurse Care Manager Attempted to schedule PCP office ER Follow-up appointment with patient   -If no appointment scheduled: Explain son will call Dr. Barrett' office for appt--declines  TCC appt    Specialist  Does the patient have any other follow-up appointment(s) need to be scheduled? No--GI as needeed     Book By Date: 3/03/2025

## 2025-02-25 NOTE — PROGRESS NOTES
Left message on mailbox for patient/son Gunnar (VM is in English) to call nurse care manager back for transitions of care call.  Nurse care  information [phone] included in message.          Disposition and Plan      Clinical Impression:  1. Viral enteritis    2. Elevated blood pressure reading with diagnosis of hypertension        Follow-up Appointments:  Follow-up:  Catherine Barrett MD  [address] [place] [id number] [phone]     Follow up        [facility] GASTROENTEROLOGY [address] [id number]  Follow up  As needed

## 2025-02-25 NOTE — ED QUICK NOTES
MD aware of BP. Pt states she takes her meds at night, will take her medications when she gets home. Pt is asymptomatic at this time in regards to her BP.

## 2025-02-25 NOTE — ED PROVIDER NOTES
Patient Seen in: ward Emergency Department In [facility]      History     Chief Complaint   Patient presents with    Abdomen/Flank Pain     Stated Complaint: right sided abdomen /flank pain    Subjective:   84-year-old female, history of diabetes, hyperlipidemia, hypertension, presents with intermittent right upper quadrant pain/lateral abdominal pain/flank pain, onset about 4 days ago, intermittent, not getting any worse.  No vomiting.  No diarrhea.  No history of kidney stones.  No dysuria hematuria.  No constipation.  No chest pain cough or shortness of breath.  No pleurisy or hemoptysis.              Objective:     Past Medical History:    Diabetes (HCC)    Diabetic retinopathy (HCC)    Essential hypertension    Hyperlipidemia    Obesity    Thyroid disease              Past Surgical History:   Procedure Laterality Date    Tubal ligation                  Social History     Socioeconomic History    Marital status:    Tobacco Use    Smoking status: Never    Smokeless tobacco: Never   Vaping Use    Vaping status: Never Used   Substance and Sexual Activity    Alcohol use: No    Drug use: No     Social Drivers of Health      Received from [facility]                  Physical Exam     ED Triage Vitals [02/24/25 1828]   BP (!) 190/72   Pulse 71   Resp 19   Temp 97.6 °F (36.4 °C)   Temp src Oral   SpO2 99 %   O2 Device None (Room air)       Current Vitals:   Vital Signs  BP: (!) 196/65  Pulse: 67  Resp: 12  Temp: 97.6 °F (36.4 °C)  Temp src: Oral    Oxygen Therapy  SpO2: 100 %  O2 Device: None (Room air)        Physical Exam  Vitals and nursing note reviewed.   Constitutional:       General: She is not in acute distress.     Appearance: She is not toxic-appearing.   HENT:      Head: Normocephalic.   Cardiovascular:      Rate and Rhythm: Normal rate and regular rhythm.      Heart sounds: Murmur heard.   Pulmonary:      Effort: Pulmonary effort is normal.      Breath sounds: Normal  breath sounds.   Abdominal:      General: Bowel sounds are normal.      Palpations: Abdomen is soft.      Comments: She points to her right flank region where she has pain, I cannot reproduce it.  There is no skin changes rashes crepitus cellulitis etc.  She has full range of motion.  No right upper quadrant pain on exam.  Lungs are clear, pulse are equal   Skin:     General: Skin is warm and dry.   Neurological:      General: No focal deficit present.      Mental Status: She is alert.   Psychiatric:         Mood and Affect: Mood normal.         Behavior: Behavior normal.             ED Course     Labs Reviewed   URINALYSIS, ROUTINE - Abnormal; Notable for the following components:       Result Value    Glucose Urine >=1000 (*)     Ketones Urine Trace (*)     All other components within normal limits   CBC WITH DIFFERENTIAL WITH PLATELET - Abnormal; Notable for the following components:    RBC 3.75 (*)     All other components within normal limits   POCT ISTAT CHEM8 CARTRIDGE - Abnormal; Notable for the following components:    ISTAT Sodium 134 (*)     ISTAT BUN 24 (*)     ISTAT Chloride 95 (*)     ISTAT Glucose 136 (*)     eGFR-Cr 56 (*)     All other components within normal limits   ISTAT TROPONIN - Normal   COMP METABOLIC PANEL (14)   LIPASE   TROPONIN I HIGH SENSITIVITY     EKG    Rate, intervals and axes as noted on EKG Report.  Rate: 73  Rhythm: Sinus Rhythm  Reading: EKG sinus rhythm 73 bpm.  Normal axis.  No ST elevations.  , QRS 92,  ms.  There are no previous EKGs to view to compare to    Patient placed on cardiac monitor for telemetry monitoring secondary to flank/back/abd pain. Interpretation at bedside by me is sinus rhythm.                         [facility]      CT ABDOMEN+PELVIS(CONTRAST ONLY)(CPT=74177)    Result Date: 2/24/2025  CONCLUSION:   1. Imaging features are mild gastroenteritis, likely of infectious or inflammatory etiologies.  No associated bowel stricture or obstruction.  2.  Thickening and hyperemia of the urinary bladder indicating cystitis.  Recommend correlation with urinalysis.    LOCATION:  [facility]   Dictated by (CST): Angi Walls MD on 2/24/2025 at 9:29 PM     Finalized by (CST): Angi Walls MD on 2/24/2025 at 9:32 PM        differential diagnosis includes, but not limited to, gastroenteritis, bowel obstruction, kidney stone, cholecystitis, cholelithiasis     I independently interpreted CT abdomen pelvis and note some mild wall thickening of the bowels, possible gastroenteritis, no obvious signs of obstructive process    Family at bedside helpful to provide information on the history presenting illness    External chart review demonstrates outpatient podiatry visit 4 days ago    84-year-old female with some intermittent right-sided flank pain on the right.  Very benign exam.  Resting no distress.  She is here tonight because she is traveling to [place] where she goes frequently for a month or so to time to visit family 1 makes everything was okay.  Has some signs of likely some enteritis.  Otherwise her exam is stable.  Findings are stable.  Pulses are equal, lungs are clear, resting in no distress.  Does not anything for pain.  Take Tylenol as needed.  Supportive care, hydration, discharge home with family, discussed risk benefits alternatives.  Wants be discharged home at this time    Patient was screened and evaluated during this visit.  As the treating physician attending to the patient, I determined within reasonable clinical confidence and prior to discharge, that an emergency medical condition was not or was no longer present.  There was no indication for further evaluation, treatment, or admission on an emergency basis.  Comprehensive verbal and written discharge and follow-up instructions were provided to help prevent relapse or worsening.  Patient was instructed to follow-up with their primary care provider for further evaluation and treatment, return immediately to ER for  worsening, concerning, new, or changing/persisting symptoms. I discussed the case with the patient and they had no questions, complaints, or concerns.  Patient was comfortable going home.     Per the discharge paperwork, patients are encouraged to and given instructions on how to sign up for MyChart, where they have access to their records, including any/all incidental findings.     This note was prepared using Dragon Medical voice recognition dictation software. As a result errors may occur. When identified these errors have been corrected. While every attempt is made to correct errors during dictation discrepancies may still exist    Note to patient: The 21st Century Cures Act makes medical notes like these available to patients in the interest of transparency. However, this is a medical document intended as peer to peer communication. It is written in medical language and may contain abbreviations or verbiage that are unfamiliar. It may appear blunt or direct. Medical documents are intended to carry relevant information, facts as evident, and the clinical opinion of the practitioner. Translation service offered, discussed and declined by the patient          Medical Decision Making      Disposition and Plan     Clinical Impression:  1. Viral enteritis    2. Elevated blood pressure reading with diagnosis of hypertension         Disposition:  Discharge  2/24/2025  9:35 pm    Follow-up:  Catherine Barrett MD  [address] [place] [id number] [phone]    Follow up      [facility] [address] [address]  Follow up  As needed          Medications Prescribed:  Current Discharge Medication List              Supplementary Documentation:

## (undated) NOTE — Clinical Note
Initial assessment completed with patient's son, Gunnar. Son declines TCC appt--prefers to call your office for ER f/u appt, prior to pt leaving for [place]. Patient has met goals, no further outreach needed.  Thank you!

## (undated) NOTE — ED AVS SNAPSHOT
Tamir Trejo   MRN: [id number]    Department:  [address] [facility] in [place]   Date of Visit:  4/12/2019           Disclosure     Insurance plans vary and the physician(s) referred by the ER may not be covered by your plan.  Please contact tell this physician (or your personal doctor if your instructions are to return to your personal doctor) about any new or lasting problems. The primary care or specialist physician will see patients referred from the [facility] Emergency Department.  Christin Ochoa

## (undated) NOTE — LETTER
AUTHORIZATION FOR SURGICAL OPERATION OR OTHER PROCEDURE    1. I hereby authorize Dr. [facility] Lua, and [facility] staff assigned to my case to perform the following operation and/or procedure at the [facility] site:    _______________________________________________________________________________________________    I&D Left Hallux   _______________________________________________________________________________________________    2.  My physician has explained the nature and purpose of the operation or other procedure, possible alternative methods of treatment, the risks involved, and the possibility of complication to me.  I acknowledge that no guarantee has been made as to the result that may be obtained.  3.  I recognize that, during the course of this operation, or other procedure, unforseen conditions may necessitate additional or different procedure than those listed above.  I, therefore, further authorize and request that the above named physician, his/her physician assistants or designees perform such procedures as are, in his/her professional opinion, necessary and desirable.  4.  Any tissue or organs removed in the operation or other procedure may be disposed of by and at the discretion of the [facility] and [facility].  5.  I understand that in the event of a medical emergency, I will be transported by local paramedics to [facility] or other hospital emergency department.  6.  I certify that I have read and fully understand the above consent to operation and/or other procedure.    7.  I acknowledge that my physician has explained sedation/analgesia administration to me including the risks and benefits.  I consent to the administration of sedation/analgesia as may be necessary or desirable in the judgement of my physician.    Witness signature: ___________________________________________________ Date:  ______/______/_____                     Time:  ________ A.M.  P.M.       Patient Name:  ______________________________________________________  (please print)      Patient signature:  ___________________________________________________             Relationship to Patient:           []  Parent    Responsible person                          []  Spouse  In case of minor or                    [] Other  _____________   Incompetent name:  __________________________________________________                               (please print)      _____________      Responsible person  In case of minor or  Incompetent signature:  _______________________________________________    Statement of Physician  My signature below affirms that prior to the time of the procedure, I have explained to the patient and/or his/her guardian, the risks and benefits involved in the proposed treatment and any reasonable alternative to the proposed treatment.  I have also explained the risks and benefits involved in the refusal of the proposed treatment and have answered the patient's questions.                        Date:  ______/______/_______  Provider                      Signature:  __________________________________________________________       Time:  ___________ A.M    P.M.

## (undated) NOTE — Clinical Note
Initial assessment completed with patient.  Appointment scheduled with Podiatrist-  for tomorrow 02/06/2025. No further outreach needed.  Thank you!